# Patient Record
Sex: MALE | Race: WHITE | NOT HISPANIC OR LATINO | Employment: FULL TIME | URBAN - METROPOLITAN AREA
[De-identification: names, ages, dates, MRNs, and addresses within clinical notes are randomized per-mention and may not be internally consistent; named-entity substitution may affect disease eponyms.]

---

## 2023-01-29 ENCOUNTER — HOSPITAL ENCOUNTER (EMERGENCY)
Facility: CLINIC | Age: 43
Discharge: HOME | End: 2023-01-29
Attending: STUDENT IN AN ORGANIZED HEALTH CARE EDUCATION/TRAINING PROGRAM | Admitting: STUDENT IN AN ORGANIZED HEALTH CARE EDUCATION/TRAINING PROGRAM
Payer: COMMERCIAL

## 2023-01-29 VITALS
TEMPERATURE: 98 F | WEIGHT: 260 LBS | HEART RATE: 66 BPM | SYSTOLIC BLOOD PRESSURE: 110 MMHG | OXYGEN SATURATION: 97 % | BODY MASS INDEX: 39.4 KG/M2 | DIASTOLIC BLOOD PRESSURE: 50 MMHG | HEIGHT: 68 IN | RESPIRATION RATE: 18 BRPM

## 2023-01-29 DIAGNOSIS — E16.2 HYPOGLYCEMIA: ICD-10-CM

## 2023-01-29 LAB
ANION GAP SERPL CALCULATED.3IONS-SCNC: 11 MMOL/L (ref 5–18)
BUN SERPL-MCNC: 16 MG/DL (ref 8–22)
CALCIUM SERPL-MCNC: 9.5 MG/DL (ref 8.5–10.5)
CHLORIDE BLD-SCNC: 103 MMOL/L (ref 98–107)
CO2 SERPL-SCNC: 25 MMOL/L (ref 22–31)
CREAT SERPL-MCNC: 0.81 MG/DL (ref 0.7–1.3)
ERYTHROCYTE [DISTWIDTH] IN BLOOD BY AUTOMATED COUNT: 11.9 % (ref 10–15)
GFR SERPL CREATININE-BSD FRML MDRD: >90 ML/MIN/1.73M2
GLUCOSE BLD-MCNC: 81 MG/DL (ref 70–125)
GLUCOSE BLDC GLUCOMTR-MCNC: 282 MG/DL (ref 70–99)
GLUCOSE BLDC GLUCOMTR-MCNC: 286 MG/DL (ref 70–99)
GLUCOSE BLDC GLUCOMTR-MCNC: 95 MG/DL (ref 70–99)
HCT VFR BLD AUTO: 45 % (ref 40–53)
HGB BLD-MCNC: 15.2 G/DL (ref 13.3–17.7)
MCH RBC QN AUTO: 30.8 PG (ref 26.5–33)
MCHC RBC AUTO-ENTMCNC: 33.8 G/DL (ref 31.5–36.5)
MCV RBC AUTO: 91 FL (ref 78–100)
PLATELET # BLD AUTO: 192 10E3/UL (ref 150–450)
POTASSIUM BLD-SCNC: 3.6 MMOL/L (ref 3.5–5)
RBC # BLD AUTO: 4.93 10E6/UL (ref 4.4–5.9)
SODIUM SERPL-SCNC: 139 MMOL/L (ref 136–145)
WBC # BLD AUTO: 10.8 10E3/UL (ref 4–11)

## 2023-01-29 PROCEDURE — 82962 GLUCOSE BLOOD TEST: CPT

## 2023-01-29 PROCEDURE — 258N000001 HC RX 258: Performed by: STUDENT IN AN ORGANIZED HEALTH CARE EDUCATION/TRAINING PROGRAM

## 2023-01-29 PROCEDURE — 80048 BASIC METABOLIC PNL TOTAL CA: CPT | Performed by: STUDENT IN AN ORGANIZED HEALTH CARE EDUCATION/TRAINING PROGRAM

## 2023-01-29 PROCEDURE — 99284 EMERGENCY DEPT VISIT MOD MDM: CPT | Mod: 25

## 2023-01-29 PROCEDURE — 85027 COMPLETE CBC AUTOMATED: CPT | Performed by: STUDENT IN AN ORGANIZED HEALTH CARE EDUCATION/TRAINING PROGRAM

## 2023-01-29 PROCEDURE — 96365 THER/PROPH/DIAG IV INF INIT: CPT

## 2023-01-29 PROCEDURE — 36415 COLL VENOUS BLD VENIPUNCTURE: CPT | Performed by: STUDENT IN AN ORGANIZED HEALTH CARE EDUCATION/TRAINING PROGRAM

## 2023-01-29 RX ORDER — DEXTROSE MONOHYDRATE 100 MG/ML
INJECTION, SOLUTION INTRAVENOUS CONTINUOUS
Status: DISPENSED | OUTPATIENT
Start: 2023-01-29 | End: 2023-01-29

## 2023-01-29 RX ADMIN — DEXTROSE MONOHYDRATE: 100 INJECTION, SOLUTION INTRAVENOUS at 11:36

## 2023-01-29 ASSESSMENT — ACTIVITIES OF DAILY LIVING (ADL)
ADLS_ACUITY_SCORE: 35
ADLS_ACUITY_SCORE: 35

## 2023-01-29 NOTE — ED PROVIDER NOTES
EMERGENCY DEPARTMENT ENCOUNTER       ED Course & Medical Decision Making     11:19 AM I met with patient for initial encounter.  2:40 PM I updated patient with plan for discharge.    Final Impression  43 year old male presents for evaluation of hypoglycemia in the setting of type 1 diabetes.  States that he was diagnosed with type 1 diabetes at about age 10, is on chronic long and short acting insulin.  States they did recently change his insulin regimen about a month ago, increased both his long and short acting insulins.  Patient was at [facility] just prior to arrival when he began feeling lightheaded, EMS initial blood sugar was 36, repeat was 31.  Was given 100 mL of D10 in route and sugars improved to 95 upon arrival.  Patient given the remainder of the 400 mL of D10 as well as orally loaded with carbohydrate rich foods and observed in the ED for almost 4 hours.  No repeated episodes of hypoglycemia, sugars increased to the 200s.  BMP within normal limits.  Does sound like he had a similar episode yesterday for which she was not seen per his wife.  Patient back to baseline.  Did discuss scaling back both short and long-acting insulin a little bit and follow closely with his primary care doctor    Short-acting back down to 13u  Long-acting back down to 45u at night    Prior to making a final disposition on this patient the results of patient's tests and other diagnostic studies were discussed with the patient. All questions were answered. Patient expressed understanding of the plan and was amenable to it.    Medical Decision Making    History:    Supplemental history from: Documented in chart, if applicable    External Record(s) reviewed: Documented in chart, if applicable.    Work Up:    Chart documentation includes differential considered and any EKGs or imaging independently interpreted by provider, where specified.    In additional to work up documented, I considered the following work up: Documented in  chart, if applicable.    External consultation:    Discussion of management with another provider: Documented in chart, if applicable    Complicating factors:    Care impacted by chronic illness: Diabetes    Care affected by social determinants of health: N/A    Disposition considerations: Discharge: Recommended changes to his home long-acting and short acting insulin regimens.      Medications   dextrose 10% infusion (0 mLs Intravenous Stopped 1/29/23 1244)     There are no discharge medications for this patient.    Final Impression     1. Hypoglycemia      Chief Complaint     Chief Complaint   Patient presents with     Hypoglycemia     Pt presents to the ED with c/o low BG prior to arrival. Pt was at [facility] when he started feeling lightheaded. BG was 36 per EMS. Around 100ml of D10 was given and BG was 79 prior to arrival in ED. Pt denies any sx at this moment.     HPI     Pelon Hess is a 43 year old male with a pertinent history of DMI, who presents for evaluation of light headedness.    Patient reports feeling light headed at [facility] prior to arrival and calling EMS. Upon EMS arrival, patient's blood sugar was 36, then 31 in transit. Patient given 100 ml d10, and his blood sugars improved to 95 at time of arrival to ED. His wife reports a similar episode yesterday where patient had blood sugars in the 30s about 1 hour after eating breakfast. Patient states that he has had episodes of light headedness in the past with low blood sugars, but that they have not been this bad.    Patient takes 15 of lispro x3/day and 50 of lantis x1/day, noting that his lispro was increased 1 month ago. Patient has taken his morning dose of lispro today and eaten breakfast. He follows with a clinic in [place].    Patient denies cough, fever, and all other relevant symptoms.    IAlistair am serving as a scribe to document services personally performed by Dr. Osbaldo Durham MD, based on my observation and the  "provider's statements to me. I, Dr. Osbaldo Durham MD attest that Alistair Garcia is acting in a scribe capacity, has observed my performance of the services and has documented them in accordance with my direction.    Past Medical History     History reviewed. No pertinent past medical history.  History reviewed. No pertinent surgical history.  History reviewed. No pertinent family history.      No Known Allergies    Relevant past medical, surgical, family and social history as documented above, has been reviewed and discussed with patient. No changes or additions, unless otherwise noted in the HPI.    Current Medications     No current outpatient medications on file.      Review of Systems     Neurologic:  Positive for light headedness.      Remainder of systems reviewed, unless noted in HPI all others negative.    Physical Exam     /50   Pulse 66   Temp 98  F (36.7  C) (Temporal)   Resp 18   Ht 1.727 m (5' 8\")   Wt 117.9 kg (260 lb)   SpO2 97%   BMI 39.53 kg/m    Constitutional: Awake, alert, in no acute distress.      Head: Normocephalic, atraumatic.      ENT: Mucous membranes moist.      Eyes: PERRL, EOMI, Conjunctiva normal.      Respiratory: Respirations even, unlabored. Lungs clear to ascultation bilaterally, in no acute respiratory distress.      Cardiovascular: Regular rate and rhythm. +2 radial pulses, equal bilaterally. No pitting edema.      GI: Abdomen soft, non-tender to palpation in all 4 quadrants. No guarding or rebound. Bowel sounds intact on all 4 quadrants.          Musculoskeletal: Moves all 4 extremities equally, strength symmetrical on bilateral uppers and lowers.      Integument: Warm, dry.   Neurologic: Alert & oriented x 3. Normal speech. Grossly normal motor and sensory function. No focal deficits noted.      Psychiatric: Normal mood and affect. Normal judgement.        Labs & Imaging     Results for orders placed or performed during the hospital encounter of 01/29/23   Basic " metabolic panel   Result Value Ref Range    Sodium 139 136 - 145 mmol/L    Potassium 3.6 3.5 - 5.0 mmol/L    Chloride 103 98 - 107 mmol/L    Carbon Dioxide (CO2) 25 22 - 31 mmol/L    Anion Gap 11 5 - 18 mmol/L    Urea Nitrogen 16 8 - 22 mg/dL    Creatinine 0.81 0.70 - 1.30 mg/dL    Calcium 9.5 8.5 - 10.5 mg/dL    Glucose 81 70 - 125 mg/dL    GFR Estimate >90 >60 mL/min/1.73m2   CBC (+ platelets, no diff)   Result Value Ref Range    WBC Count 10.8 4.0 - 11.0 10e3/uL    RBC Count 4.93 4.40 - 5.90 10e6/uL    Hemoglobin 15.2 13.3 - 17.7 g/dL    Hematocrit 45.0 40.0 - 53.0 %    MCV 91 78 - 100 fL    MCH 30.8 26.5 - 33.0 pg    MCHC 33.8 31.5 - 36.5 g/dL    RDW 11.9 10.0 - 15.0 %    Platelet Count 192 150 - 450 10e3/uL   Glucose by meter   Result Value Ref Range    GLUCOSE BY METER POCT 95 70 - 99 mg/dL   Glucose by meter   Result Value Ref Range    GLUCOSE BY METER POCT 286 (H) 70 - 99 mg/dL   Glucose by meter   Result Value Ref Range    GLUCOSE BY METER POCT 282 (H) 70 - 99 mg/dL          Osbaldo Durham MD  01/29/23 4218

## 2023-01-29 NOTE — DISCHARGE INSTRUCTIONS
Lets drop your insulin back down a little bit for the time being;  Short-acting back down to 13u  Long-acting back down to 45u at night

## 2023-01-29 NOTE — ED TRIAGE NOTES
Pt presents to the ED with c/o low BG prior to arrival. Pt was at [facility] when he started feeling lightheaded. BG was 36 per EMS. Around 100ml of D10 was given and BG was 79 prior to arrival in ED. Pt denies any sx at this moment.